# Patient Record
Sex: MALE | Race: WHITE | NOT HISPANIC OR LATINO | Employment: OTHER | URBAN - METROPOLITAN AREA
[De-identification: names, ages, dates, MRNs, and addresses within clinical notes are randomized per-mention and may not be internally consistent; named-entity substitution may affect disease eponyms.]

---

## 2020-09-01 ENCOUNTER — OFFICE VISIT (OUTPATIENT)
Dept: NEUROLOGY | Facility: CLINIC | Age: 45
End: 2020-09-01

## 2020-09-01 VITALS
HEIGHT: 76 IN | RESPIRATION RATE: 20 BRPM | BODY MASS INDEX: 30.69 KG/M2 | OXYGEN SATURATION: 97 % | SYSTOLIC BLOOD PRESSURE: 122 MMHG | DIASTOLIC BLOOD PRESSURE: 84 MMHG | TEMPERATURE: 98.4 F | WEIGHT: 252 LBS | HEART RATE: 69 BPM

## 2020-09-01 DIAGNOSIS — R40.20 LOSS OF CONSCIOUSNESS (HCC): Primary | ICD-10-CM

## 2020-09-01 PROCEDURE — 99204 OFFICE O/P NEW MOD 45 MIN: CPT | Performed by: PHYSICIAN ASSISTANT

## 2020-09-01 RX ORDER — DOXEPIN HYDROCHLORIDE 50 MG/1
CAPSULE ORAL
COMMUNITY
Start: 2020-07-14

## 2020-09-01 RX ORDER — GABAPENTIN 800 MG/1
TABLET ORAL
COMMUNITY
Start: 2020-08-12

## 2020-09-01 RX ORDER — DIAZEPAM 10 MG/1
10 TABLET ORAL 2 TIMES DAILY PRN
COMMUNITY

## 2020-09-01 RX ORDER — LISINOPRIL 20 MG/1
TABLET ORAL
COMMUNITY
Start: 2020-06-18

## 2020-09-01 RX ORDER — LAMOTRIGINE 200 MG/1
TABLET ORAL
COMMUNITY
Start: 2020-08-18

## 2020-09-01 RX ORDER — OMEPRAZOLE 20 MG/1
CAPSULE, DELAYED RELEASE ORAL
COMMUNITY
Start: 2020-08-18

## 2020-09-01 RX ORDER — OXYCODONE AND ACETAMINOPHEN 10; 325 MG/1; MG/1
TABLET ORAL
COMMUNITY
Start: 2020-08-24

## 2020-09-01 RX ORDER — TIZANIDINE 4 MG/1
TABLET ORAL
COMMUNITY
Start: 2020-08-28

## 2020-09-01 RX ORDER — BUSPIRONE HYDROCHLORIDE 15 MG/1
TABLET ORAL
COMMUNITY
Start: 2020-08-28

## 2020-09-01 NOTE — PROGRESS NOTES
"Subjective       Chief Complaint: seizures      History of Present Illness   Timothy Puga is a 45 y.o. male who comes to clinic today for evaluation of possible seizures. He initially noted symptoms after he fell and hit hit head, suffering loss of consciousness in 02/2016. He and his family note spells of loss of consciousness lasting 3-4 minutes, with associated convulsions. There is associated urinary incontinence as well as fatigue and confusion following the episodes. His family also notes episodes of decreased responsiveness lasting several minutes with associated fatigue and confusion. These spells may occur as frequently as 3-4 times a week, though he has noted a period of several weeks to months without episodes. His symptoms are worse with increased stress, lack of sleep, and pain.     He has previously been followed by [facility]. Unfortunately, we do not have these records. 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 He states that he was previously intolerant of Keppra.    He also reports chronic neck and back pain, for which he is following with pain management.       I have reviewed and confirmed the past family, social and medical history as accurate on 9/1/2020.     Review of Systems   Constitutional: Negative.    HENT: Negative.    Eyes: Negative.    Respiratory: Negative.    Cardiovascular: Negative.    Gastrointestinal: Negative.    Endocrine: Negative.    Genitourinary: Negative.    Musculoskeletal: Negative.    Skin: Negative.    Allergic/Immunologic: Negative.    Neurological: Positive for seizures.   Hematological: Negative.    Psychiatric/Behavioral: Negative.        Objective     /84   Pulse 69   Temp 98.4 °F (36.9 °C) (Temporal)   Resp 20   Ht 193 cm (76\")   Wt 114 kg (252 lb)   SpO2 97%   BMI 30.67 kg/m²     General appearance today is normal.   Peripheral pulses were present and symmetric. "   The ophthalmoscopic exam today is unremarkable. The discs and posterior elements are unremarkable.      Physical Exam   Neurological: He has normal strength. He has a normal Finger-Nose-Finger Test and a normal Heel to Shin Test. Gait normal.   Reflex Scores:       Patellar reflexes are 2+ on the right side and 2+ on the left side.  Psychiatric: His speech is normal.        Neurologic Exam     Mental Status   Speech: speech is normal   Level of consciousness: alert  Normal comprehension.     Cranial Nerves   Cranial nerves II through XII intact.     Motor Exam   Muscle bulk: normal  Overall muscle tone: normal    Strength   Strength 5/5 throughout.     Sensory Exam   Light touch normal.     Gait, Coordination, and Reflexes     Gait  Gait: normal    Coordination   Finger to nose coordination: normal  Heel to shin coordination: normal    Tremor   Resting tremor: absent    Reflexes   Right patellar: 2+  Left patellar: 2+          Assessment/Plan   Timotyh was seen today for seizures.    Diagnoses and all orders for this visit:    Loss of consciousness (CMS/HCC)  -     MRI Brain Without Contrast  -     EEG Awake or Drowsy Routine          Discussion/Summary   Timothy Puga comes to clinic today for evaluation of possible seizures. This was discussed. It was elected to obtain an MRI of the brain and EEG as his family reports he has not had a recent workup. We will also obtain his records from his hospitalization at . It was not elected to add any new medications at this time. He will then follow up in 2 months, or sooner if needed.   I spent 45 minutes face to face with the patient and family with 30 minutes spent on discussing diagnosis, prognosis, diagnostic testing, evaluation, current status, treatment options and management as discussed above.       As part of this visit I reviewed outside records and obtained additional history from the family which is incorporated in the HPI.      Saida Cade PA-C

## 2020-09-30 ENCOUNTER — APPOINTMENT (OUTPATIENT)
Dept: NEUROLOGY | Facility: HOSPITAL | Age: 45
End: 2020-09-30

## 2020-09-30 ENCOUNTER — APPOINTMENT (OUTPATIENT)
Dept: MRI IMAGING | Facility: HOSPITAL | Age: 45
End: 2020-09-30